# Patient Record
Sex: FEMALE | Race: WHITE | NOT HISPANIC OR LATINO | Employment: FULL TIME | URBAN - METROPOLITAN AREA
[De-identification: names, ages, dates, MRNs, and addresses within clinical notes are randomized per-mention and may not be internally consistent; named-entity substitution may affect disease eponyms.]

---

## 2021-12-28 ENCOUNTER — TELEPHONE (OUTPATIENT)
Dept: SCHEDULING | Facility: IMAGING CENTER | Age: 41
End: 2021-12-28

## 2021-12-29 ENCOUNTER — HOSPITAL ENCOUNTER (OUTPATIENT)
Dept: LAB | Facility: MEDICAL CENTER | Age: 41
End: 2021-12-29
Payer: COMMERCIAL

## 2021-12-29 ENCOUNTER — OFFICE VISIT (OUTPATIENT)
Dept: MEDICAL GROUP | Facility: PHYSICIAN GROUP | Age: 41
End: 2021-12-29
Payer: COMMERCIAL

## 2021-12-29 VITALS
HEART RATE: 68 BPM | OXYGEN SATURATION: 98 % | TEMPERATURE: 98.4 F | DIASTOLIC BLOOD PRESSURE: 70 MMHG | BODY MASS INDEX: 24.59 KG/M2 | SYSTOLIC BLOOD PRESSURE: 108 MMHG | HEIGHT: 64 IN | RESPIRATION RATE: 16 BRPM | WEIGHT: 144 LBS

## 2021-12-29 DIAGNOSIS — R42 DIZZINESS: ICD-10-CM

## 2021-12-29 DIAGNOSIS — Z13.0 SCREENING FOR ENDOCRINE, NUTRITIONAL, METABOLIC AND IMMUNITY DISORDER: ICD-10-CM

## 2021-12-29 DIAGNOSIS — Z13.228 SCREENING FOR ENDOCRINE, NUTRITIONAL, METABOLIC AND IMMUNITY DISORDER: ICD-10-CM

## 2021-12-29 DIAGNOSIS — Z12.31 ENCOUNTER FOR SCREENING MAMMOGRAM FOR BREAST CANCER: ICD-10-CM

## 2021-12-29 DIAGNOSIS — Z13.21 SCREENING FOR ENDOCRINE, NUTRITIONAL, METABOLIC AND IMMUNITY DISORDER: ICD-10-CM

## 2021-12-29 DIAGNOSIS — Z13.29 SCREENING FOR ENDOCRINE, NUTRITIONAL, METABOLIC AND IMMUNITY DISORDER: ICD-10-CM

## 2021-12-29 DIAGNOSIS — H81.13 BPPV (BENIGN PAROXYSMAL POSITIONAL VERTIGO), BILATERAL: ICD-10-CM

## 2021-12-29 PROBLEM — K11.8 SALIVARY GLAND OBSTRUCTION: Status: RESOLVED | Noted: 2021-12-29 | Resolved: 2021-12-29

## 2021-12-29 PROBLEM — K11.8 SALIVARY GLAND OBSTRUCTION: Status: ACTIVE | Noted: 2021-12-29

## 2021-12-29 LAB
25(OH)D3 SERPL-MCNC: 35 NG/ML (ref 30–100)
ALBUMIN SERPL BCP-MCNC: 4.2 G/DL (ref 3.2–4.9)
ALBUMIN/GLOB SERPL: 1.6 G/DL
ALP SERPL-CCNC: 50 U/L (ref 30–99)
ALT SERPL-CCNC: 15 U/L (ref 2–50)
ANION GAP SERPL CALC-SCNC: 10 MMOL/L (ref 7–16)
AST SERPL-CCNC: 18 U/L (ref 12–45)
BILIRUB SERPL-MCNC: 0.5 MG/DL (ref 0.1–1.5)
BUN SERPL-MCNC: 13 MG/DL (ref 8–22)
CALCIUM SERPL-MCNC: 9 MG/DL (ref 8.5–10.5)
CHLORIDE SERPL-SCNC: 108 MMOL/L (ref 96–112)
CHOLEST SERPL-MCNC: 190 MG/DL (ref 100–199)
CO2 SERPL-SCNC: 24 MMOL/L (ref 20–33)
CREAT SERPL-MCNC: 0.71 MG/DL (ref 0.5–1.4)
ERYTHROCYTE [DISTWIDTH] IN BLOOD BY AUTOMATED COUNT: 45.7 FL (ref 35.9–50)
FASTING STATUS PATIENT QL REPORTED: NORMAL
GLOBULIN SER CALC-MCNC: 2.6 G/DL (ref 1.9–3.5)
GLUCOSE SERPL-MCNC: 97 MG/DL (ref 65–99)
HCT VFR BLD AUTO: 44.7 % (ref 37–47)
HDLC SERPL-MCNC: 64 MG/DL
HGB BLD-MCNC: 14.9 G/DL (ref 12–16)
LDLC SERPL CALC-MCNC: 114 MG/DL
MCH RBC QN AUTO: 31.8 PG (ref 27–33)
MCHC RBC AUTO-ENTMCNC: 33.3 G/DL (ref 33.6–35)
MCV RBC AUTO: 95.5 FL (ref 81.4–97.8)
PLATELET # BLD AUTO: 232 K/UL (ref 164–446)
PMV BLD AUTO: 10.9 FL (ref 9–12.9)
POTASSIUM SERPL-SCNC: 4.3 MMOL/L (ref 3.6–5.5)
PROT SERPL-MCNC: 6.8 G/DL (ref 6–8.2)
RBC # BLD AUTO: 4.68 M/UL (ref 4.2–5.4)
SODIUM SERPL-SCNC: 142 MMOL/L (ref 135–145)
TRIGL SERPL-MCNC: 62 MG/DL (ref 0–149)
TSH SERPL DL<=0.005 MIU/L-ACNC: 4.15 UIU/ML (ref 0.38–5.33)
WBC # BLD AUTO: 8.1 K/UL (ref 4.8–10.8)

## 2021-12-29 PROCEDURE — 85027 COMPLETE CBC AUTOMATED: CPT

## 2021-12-29 PROCEDURE — 80053 COMPREHEN METABOLIC PANEL: CPT

## 2021-12-29 PROCEDURE — 80061 LIPID PANEL: CPT

## 2021-12-29 PROCEDURE — 84443 ASSAY THYROID STIM HORMONE: CPT

## 2021-12-29 PROCEDURE — 82306 VITAMIN D 25 HYDROXY: CPT

## 2021-12-29 PROCEDURE — 36415 COLL VENOUS BLD VENIPUNCTURE: CPT

## 2021-12-29 PROCEDURE — 99203 OFFICE O/P NEW LOW 30 MIN: CPT

## 2021-12-29 RX ORDER — MECLIZINE HCL 12.5 MG/1
12.5 TABLET ORAL 3 TIMES DAILY PRN
Qty: 60 TABLET | Refills: 0 | Status: SHIPPED | OUTPATIENT
Start: 2021-12-29 | End: 2022-03-31

## 2021-12-29 ASSESSMENT — PATIENT HEALTH QUESTIONNAIRE - PHQ9: CLINICAL INTERPRETATION OF PHQ2 SCORE: 0

## 2021-12-29 NOTE — ASSESSMENT & PLAN NOTE
Patient reports that she woke up with an episode of dizziness in mid October that lasted for couple of hours and improved with Dramamine.  This started again on [place] Day but was a little different.  She noted that if she moves the wrong way or in the wrong direction, the room would start spinning.  This caused some nausea.  It was intermittent over several days.  She noticed that it was worse when transitioning from laying to sitting.  She has no ringing in the ears.  She did start training to do an 8 mile swim race back in September with her sister and has been swimming 3 times per week for an hour to an hour and a half.

## 2021-12-29 NOTE — PROGRESS NOTES
CC:    Chief Complaint   Patient presents with   • Establish Care   • Dizziness        HISTORY OF THE PRESENT ILLNESS: This is a pleasant 41 y.o. female presenting today to [facility], who wishes to discuss the following problems listed below:     Dizziness  Patient reports that she woke up with an episode of dizziness in mid October that lasted for couple of hours and improved with Dramamine.  This started again on [place] Day but was a little different.  She noted that if she moves the wrong way or in the wrong direction, the room would start spinning.  This caused some nausea.  It was intermittent over several days.  She noticed that it was worse when transitioning from laying to sitting.  She has no ringing in the ears.  She did start training to do an 8 mile swim race back in September with her sister and has been swimming 3 times per week for an hour to an hour and a half.     Patient is otherwise healthy with no chronic medical conditions.  She and her  just moved here from [place] due to his job.  She has 3 children, ages 11, 22 and 29.  All are healthy.  No history of cancers in her family.  No chronic medical conditions to report.     History reviewed. No pertinent past medical history.    Allergies: Penicillins    Current Outpatient Medications   Medication Sig Dispense Refill   • meclizine (ANTIVERT) 12.5 MG Tab Take 1 Tablet by mouth 3 times a day as needed for Dizziness. 60 Tablet 0     No current facility-administered medications for this visit.       ROS:     Constitutional: Patient denies any fever, chills, night sweats, weight loss/gain, fatigue, or malaise.   Eyes: Patient denies any photophobia, eye pain, diplopia, discharge, dryness, excessive tearing, redness, or VA changes.   ENT: Patient denies any runny nose, hoarseness, sore throat, or dysphagia.   Resp: Patient denies cough, wheezing, or shortness of breath.   CV: Patient denies any chest pain, claudication, cyanosis,  "palpitations, or edema.   GI: Patient denies any constipation, nausea, vomiting, diarrhea, bloating, abdominal pain, or dyspepsia.   MSK: Patient denies any joint pain, cramps, swelling, weakness, stiffness, or tremor  Skin: Patient denies any color changes, skin changes, lesions, ulcerations, wounds, and pruritus, hair or nail changes.   Neuro: Patient denies any headache, numbness or tingling  Psych: Patient denies any suicidal or homicidal ideation, depression or anxiety.     Exam: /70 (BP Location: Right arm, Patient Position: Sitting, BP Cuff Size: Adult)   Pulse 68   Temp 36.9 °C (98.4 °F) (Temporal)   Resp 16   Ht 1.626 m (5' 4\")   Wt 65.3 kg (144 lb)   SpO2 98%  Body mass index is 24.72 kg/m².    Gen: Alert and oriented x4. Well developed, well-nourished female in no apparent distress.  Skin: Warm, dry, good turgor, no rashes in visible areas or lacerations appreciated.   Eye: EOM intact, pupils equal, round and reactive, conjunctiva clear, lids normal.  Neck: Trachea midline, no masses, no thyromegaly  GI:  Soft, non-tender abdomen with no distention.   MSK: Normal gait, moves all extremities.  Neuro: Alert and oriented x 4, non-focal exam with motor and sensory grossly intact.  Ext: No clubbing, cyanosis, edema.  Psych: Normal behavior, affect and mood.      Assessment/Plan:    41 y.o. female with the followin. BPPV (benign paroxysmal positional vertigo), bilateral  Discussed with patient that I am suspicious that she has BPPV.  No red flag symptoms today.  The episode that started over  is since resolved.  Meclizine provided for as needed relief of dizziness should another episode return.  Discussed proper precautions of protecting both ear canals during swimming sessions.  Patient has purchased a swimming And earplugs to use.  Refer to physical therapy for vestibular therapy.  - meclizine (ANTIVERT) 12.5 MG Tab; Take 1 Tablet by mouth 3 times a day as needed for Dizziness.  " Dispense: 60 Tablet; Refill: 0  - Referral to Physical Therapy    2. Dizziness  Rule out underlying anemia or electrolyte abnormalities.  - Comp Metabolic Panel; Future  - CBC WITHOUT DIFFERENTIAL; Future    3. Screening for endocrine, nutritional, metabolic and immunity disorder  - TSH WITH REFLEX TO FT4; Future  - VITAMIN D,25 HYDROXY; Future  - Lipid Profile; Future    4. Encounter for screening mammogram for breast cancer  - MA-SCREENING MAMMO BILAT W/TOMOSYNTHESIS W/CAD; Future    Follow-up: Return in about 3 months (around 3/29/2022) for pap, review dizziness .    Health Maintenance: Completed    I have placed the below orders and discussed them with an approved delegating provider.  The MA is performing the below orders under the direction of Mariely Cuevas MD.    Please note that this dictation was created using voice recognition software. I have made every reasonable attempt to correct obvious errors, but I expect that there are errors of grammar and possibly content that I did not discover before finalizing the note.    Electronically signed by BRAYDEN Clemons on December 29, 2021

## 2022-01-27 ENCOUNTER — HOSPITAL ENCOUNTER (OUTPATIENT)
Dept: RADIOLOGY | Facility: MEDICAL CENTER | Age: 42
End: 2022-01-27
Payer: COMMERCIAL

## 2022-01-27 DIAGNOSIS — Z12.31 ENCOUNTER FOR SCREENING MAMMOGRAM FOR BREAST CANCER: ICD-10-CM

## 2022-01-27 PROCEDURE — 77063 BREAST TOMOSYNTHESIS BI: CPT

## 2022-02-14 ENCOUNTER — PHYSICAL THERAPY (OUTPATIENT)
Dept: PHYSICAL THERAPY | Facility: REHABILITATION | Age: 42
End: 2022-02-14
Payer: COMMERCIAL

## 2022-02-14 DIAGNOSIS — H81.13 BPPV (BENIGN PAROXYSMAL POSITIONAL VERTIGO), BILATERAL: ICD-10-CM

## 2022-02-14 PROCEDURE — 97162 PT EVAL MOD COMPLEX 30 MIN: CPT

## 2022-02-14 PROCEDURE — 95992 CANALITH REPOSITIONING PROC: CPT

## 2022-02-14 NOTE — OP THERAPY EVALUATION
Outpatient Physical Therapy  VESTIBULAR EVALUATION    [facility] Physical [address]  [address]  [place] [address]  Phone:  [phone]  Fax:  [phone]    Date of Evaluation: 02/14/2022    Patient: Yareli Isaacs  YOB: 1980  MRN: [id number]     Referring Provider: YOL[place] [place] [address]   Referring Diagnosis: BPPV (benign paroxysmal positional vertigo), bilateral [H81.13]     Time Calculation    Start time: 1435  Stop time: 1515 Time Calculation (min): 40 minutes           Chief Complaint: Loss Of Balance    Visit Diagnoses     ICD-10-CM   1. BPPV (benign paroxysmal positional vertigo), bilateral  H81.13         History of Present Illness:     Date of onset:  10/10/2021    Mechanism of injury:  Pt reports dizziness occurred after increasing her swims to longer distance.  Swam three miles in October.  Next day, woke dizzy (spinning) and couldn't move.  Lasted an hour to hour and a half. After that, started wearing ear plugs in water in hopes of avoiding dizziness.  Dec 25, dizziness returned.  Certain movements make her dizzy and nauseous.  Went to PCP, rx'd Meclizine.  Only took it once- made her tired and did not resolve dizziness.  Dizziness occurs when bending forward, lying down. move head to right when lying down, and sitting up from lying down.  Also when rolling left sidelying to right sidelying.  It sometimes wakes her from sleep when she rolls or turns her head.  Gets in/out of bed on right side.     Symptoms are worse after swimming, even with ear plugs.  Driving, visual scanning- reports no symptoms, but being careful.  Video game bothers her.  Pt is a - a lot of moving around. Sometimes symptomatic at work when bending to pick things up.  Not getting nauseous or headaches assoc with dizziness anymore.    Ear problems:  None    Sleep disturbance:  Interrupted sleep  Patient  goals:     Patient goals for therapy:  Improved balance      Past Medical History:   Diagnosis Date   • Salivary gland obstruction 12/29/2021     No past surgical history on file.  Social History     Tobacco Use   • Smoking status: Never Smoker   • Smokeless tobacco: Never Used   Substance Use Topics   • Alcohol use: Not Currently     Family and Occupational History     Socioeconomic History   • Marital status:      Spouse name: Not on file   • Number of children: Not on file   • Years of education: Not on file   • Highest education level: Not on file   Occupational History   • Not on file       Objective:    Gait:     Comments: WNL  Vestibulospinal Exam:     Comments: Narrow stance- EO and EC= WNL.  Mild increase sway with EC.  Narrow stance on foam- EO and EC, increased sway with EC, required SBA/CGA.  No symptoms reported.  Oculomotor Exam:         Details: No spontaneous nystagmus eccentric gaze    No saccadic eye movements    Smooth pursuit present    Convergence:        Normal convergence  Vertebrobasilar Exam:    Normal De Kleyn-Nieuwenhuyse test    Normal sustained cervical rotation test  Vestibulo-Ocular Exam:     Normal head-shaking nystagmus test  Normal head thrust test  BPPV Exam:     Abnormal Susan-Hallpike        Latency (sec): 7        Duration (sec): 5        Findings: positive right    Comments: Right Susan-Hallpike positive for spinning.        Therapeutic Treatments and Modalities:     1. Canalith Repositioning (CPT 50560), Right Epley's performed., Delayed onset of spinning in L sidelying position (30 sec latency, 8 sec duration)., Pt performed sit to supine and supine to sit at right side of therapy mat following Epley's, with minimal to no symptoms.    Therapeutic Treatment and Modalities Summary: Instructed in self-Epley.  Recommend perform 1x nightly, after swimming.    Time-based treatments/modalities:             Assessment:     Functional impairments:  Positive BPPV (right)     Assessment details:  41 y.o. female presents with signs/symptoms consistent with right BPPV.  Pt demonstrates improvement in symptoms following right Epley's maneuver.  Pt will benefit from skilled PT services to assess for progression or resolution of symptoms, adjust HEP as needed, and provide education and prognosis information.    Prognosis: good    Goals:     Short term goals:  1. Pt reports minimal to no symptoms when lying down at home.  2. Pt able to bend forward to floor without symptoms.      Short term goal time span:  2-4 weeks    Long term goals:  1. Pt able to self-manage vertigo symptoms independently.  2. Pt has less than three episodes of dizziness a week.    Long term goal time span:  6-8 weeks  Plan:     Therapy options:  Physical therapy treatment to continue    Planned therapy interventions:  Canalith Repositioning (CPT 17569), Therapeutic Exercise (CPT 67815) and Neuromuscular Re-education (CPT 33981)    Frequency:  1x week    Duration in visits:  6    Discussed with:  Patient      Functional Assessment Used          Referring provider co-signature:  I have reviewed this plan of care and my co-signature certifies the need for services.    Certification Period: 02/14/2022 to  04/11/22    Physician Signature: ________________________________ Date: ______________

## 2022-02-21 ENCOUNTER — PHYSICAL THERAPY (OUTPATIENT)
Dept: PHYSICAL THERAPY | Facility: REHABILITATION | Age: 42
End: 2022-02-21
Payer: COMMERCIAL

## 2022-02-21 DIAGNOSIS — H81.13 BPPV (BENIGN PAROXYSMAL POSITIONAL VERTIGO), BILATERAL: ICD-10-CM

## 2022-02-21 PROCEDURE — 97112 NEUROMUSCULAR REEDUCATION: CPT

## 2022-02-21 NOTE — OP THERAPY DAILY TREATMENT
Outpatient Physical Therapy  DAILY TREATMENT     [facility] Physical [address].  [address]  [place] [address]  Phone:  [phone]  Fax:  [phone]    Date: 02/21/2022    Patient: Yareli Isaacs  YOB: 1980  MRN: [id number]     Time Calculation    Start time: 1430  Stop time: 1454 Time Calculation (min): 24 minutes         Chief Complaint: Vertigo    Visit #: 2    SUBJECTIVE:  Pt reports overall no symptoms since original appointments.       OBJECTIVE:  Eval:Abnormal Grantham-Hallpike        Latency (sec): 7        Duration (sec): 5        Findings: positive right    Comments: Right Susan-Hallpike positive for spinning.          Therapeutic Exercises (CPT 85630):     20. 2/14-4/11    Therapeutic Treatments and Modalities:     1. Neuromuscular Re-education (CPT 57474)    Therapeutic Treatment and Modalities Summary: Reassessment of Grantham hallpike, log roll, head hang all negative bilaterally.  Pt educated on anatomy related to vestibular system and BPV with use of model. Handouts provided and performed self epley maneuver with use f pillow. Verbalized all instructions. Pt verbalized understanding.     Time-based treatments/modalities:    Physical Therapy Timed Treatment Charges  Neuromusc re-ed, balance, coor, post minutes (CPT 53951): 24 minutes    ASSESSMENT:   Today, pt presents without abnormal positional vertigo testing and subjective reports of resolution of symptoms. Time spent educated on self maneuver and reoccur ance rates. Will leave pt chart open x 30 days in case symptoms return with likely plan to DC for resolution of symptoms. Pt verbalized agreement.     PLAN/RECOMMENDATIONS:   Plan for treatment: DC likely, reassess as needed x 30 days

## 2022-03-10 ENCOUNTER — APPOINTMENT (OUTPATIENT)
Dept: PHYSICAL THERAPY | Facility: REHABILITATION | Age: 42
End: 2022-03-10
Payer: COMMERCIAL

## 2022-03-15 ENCOUNTER — APPOINTMENT (OUTPATIENT)
Dept: PHYSICAL THERAPY | Facility: REHABILITATION | Age: 42
End: 2022-03-15
Payer: COMMERCIAL

## 2022-03-17 ENCOUNTER — APPOINTMENT (OUTPATIENT)
Dept: PHYSICAL THERAPY | Facility: REHABILITATION | Age: 42
End: 2022-03-17
Payer: COMMERCIAL

## 2022-03-21 ENCOUNTER — APPOINTMENT (OUTPATIENT)
Dept: PHYSICAL THERAPY | Facility: REHABILITATION | Age: 42
End: 2022-03-21
Payer: COMMERCIAL

## 2022-03-23 ENCOUNTER — APPOINTMENT (OUTPATIENT)
Dept: PHYSICAL THERAPY | Facility: REHABILITATION | Age: 42
End: 2022-03-23
Payer: COMMERCIAL

## 2022-03-28 ENCOUNTER — APPOINTMENT (OUTPATIENT)
Dept: PHYSICAL THERAPY | Facility: REHABILITATION | Age: 42
End: 2022-03-28
Payer: COMMERCIAL

## 2022-03-30 ENCOUNTER — APPOINTMENT (OUTPATIENT)
Dept: PHYSICAL THERAPY | Facility: REHABILITATION | Age: 42
End: 2022-03-30
Payer: COMMERCIAL

## 2022-03-31 ENCOUNTER — OFFICE VISIT (OUTPATIENT)
Dept: MEDICAL GROUP | Facility: PHYSICIAN GROUP | Age: 42
End: 2022-03-31
Payer: COMMERCIAL

## 2022-03-31 ENCOUNTER — HOSPITAL ENCOUNTER (OUTPATIENT)
Facility: MEDICAL CENTER | Age: 42
End: 2022-03-31
Payer: COMMERCIAL

## 2022-03-31 VITALS
OXYGEN SATURATION: 96 % | SYSTOLIC BLOOD PRESSURE: 102 MMHG | TEMPERATURE: 98.8 F | RESPIRATION RATE: 16 BRPM | WEIGHT: 133 LBS | HEART RATE: 76 BPM | HEIGHT: 64 IN | DIASTOLIC BLOOD PRESSURE: 62 MMHG | BODY MASS INDEX: 22.71 KG/M2

## 2022-03-31 DIAGNOSIS — Z11.51 SCREENING FOR HPV (HUMAN PAPILLOMAVIRUS): ICD-10-CM

## 2022-03-31 DIAGNOSIS — Z01.419 ENCOUNTER FOR GYNECOLOGICAL EXAMINATION: ICD-10-CM

## 2022-03-31 DIAGNOSIS — Z12.4 SCREENING FOR CERVICAL CANCER: ICD-10-CM

## 2022-03-31 PROCEDURE — 87624 HPV HI-RISK TYP POOLED RSLT: CPT

## 2022-03-31 PROCEDURE — 87591 N.GONORRHOEAE DNA AMP PROB: CPT

## 2022-03-31 PROCEDURE — 99396 PREV VISIT EST AGE 40-64: CPT

## 2022-03-31 PROCEDURE — 87491 CHLMYD TRACH DNA AMP PROBE: CPT

## 2022-03-31 PROCEDURE — 88175 CYTOPATH C/V AUTO FLUID REDO: CPT

## 2022-03-31 ASSESSMENT — FIBROSIS 4 INDEX: FIB4 SCORE: 0.86

## 2022-03-31 ASSESSMENT — PATIENT HEALTH QUESTIONNAIRE - PHQ9: CLINICAL INTERPRETATION OF PHQ2 SCORE: 0

## 2022-03-31 NOTE — PROGRESS NOTES
Subjective:     CC:   Chief Complaint   Patient presents with   • Follow-Up     Dizzy and blood work       HPI:   Yareli Isaacs is a 41 y.o. female who presents for annual exam. She is feeling well and denies any complaints.    Ob-Gyn/ History:    Patient has GYN provider: no  /Para:    Last Pap Smear:  6 years ago. No history of abnormal pap smears.  Gyn Surgery:  Tubes tied.  Current Contraceptive Method: Tubal ligation   Is currently sexually active.  Last menstrual period:  3/15/2022.  Periods regular. moderate bleeding. Cramping is moderate.   She does take OTC analgesics for cramps.  No significant bloating/fluid retention, pelvic pain, or dyspareunia. No vaginal discharge  Urinary incontinence: None, stress incontinence.       Health Maintenance  Cholesterol Screening: UTD   Diabetes Screening: UTD   Substance Abuse: None   Safe in relationship.   Seat belts, bike helmet, gun safety discussed.  Sun protection used.    Cancer screening  Cervical Cancer Screening: Done today   Breast Cancer Screening: Mammogram up to date     Infectious disease screening/Immunizations  --Practices safe sex.  --Immunizations:    Influenza: Up to date        She  has a past medical history of Salivary gland obstruction (2021).  She  has no past surgical history on file.    No family history on file.    Social History     Socioeconomic History   • Marital status:      Spouse name: Not on file   • Number of children: Not on file   • Years of education: Not on file   • Highest education level: Not on file   Occupational History   • Not on file   Tobacco Use   • Smoking status: Never Smoker   • Smokeless tobacco: Never Used   Vaping Use   • Vaping Use: Never used   Substance and Sexual Activity   • Alcohol use: Not Currently   • Drug use: Never   • Sexual activity: Yes     Partners: Male   Other Topics Concern   • Not on file   Social History Narrative   • Not on file     Social Determinants of Health  "    Financial Resource Strain: Not on file   Food Insecurity: Not on file   Transportation Needs: Not on file   Physical Activity: Not on file   Stress: Not on file   Social Connections: Not on file   Intimate Partner Violence: Not on file   Housing Stability: Not on file       Patient Active Problem List    Diagnosis Date Noted   • Dizziness 12/29/2021         No current outpatient medications on file.     No current facility-administered medications for this visit.     Allergies   Allergen Reactions   • Penicillins Rash     Rash        Review of Systems   Constitutional: Negative for fever, chills and malaise/fatigue.   HENT: Negative for congestion.    Eyes: Negative for pain.   Respiratory: Negative for cough and shortness of breath.    Cardiovascular: Negative for leg swelling.   Gastrointestinal: Negative for nausea, vomiting, abdominal pain and diarrhea.   Genitourinary: Negative for dysuria and hematuria.   Skin: Negative for rash.   Neurological: Negative for dizziness, focal weakness and headaches.   Endo/Heme/Allergies: Does not bruise/bleed easily.   Psychiatric/Behavioral: Negative for depression.  The patient is not nervous/anxious.      Objective:     /62   Pulse 76   Temp 37.1 °C (98.8 °F) (Temporal)   Resp 16   Ht 1.626 m (5' 4\")   Wt 60.3 kg (133 lb)   LMP 03/15/2022   SpO2 96%   Breastfeeding No   BMI 22.83 kg/m²   Body mass index is 22.83 kg/m².  Wt Readings from Last 4 Encounters:   03/31/22 60.3 kg (133 lb)   12/29/21 65.3 kg (144 lb)       Physical Exam:  Constitutional: Well-developed and well-nourished. Not diaphoretic. No distress.   Skin: Skin is warm and dry. No rash noted.  Head: Atraumatic without lesions.  Eyes: Conjunctivae and extraocular motions are normal. Pupils are equal, round, and reactive to light. No scleral icterus.   Ears:  External ears unremarkable. Tympanic membranes clear and intact.  Nose: Nares patent. Septum midline. Turbinates without erythema nor " edema. No discharge.   Mouth/Throat: Dentition is intact. Tongue normal. Oropharynx is clear and moist. Posterior pharynx without erythema or exudates.  Neck: Supple, trachea midline. Normal range of motion. No thyromegaly present. No lymphadenopathy--cervical or supraclavicular.  Abdomen: Soft, non tender, and without distention. Active bowel sounds in all four quadrants. No rebound, guarding, masses or HSM.  :Perineum and external genitalia normal without rash. Vagina with normal and physiologic discharge. Cervix without visible lesions or discharge. Bimanual exam without adnexal masses or cervical motion tenderness.  Extremities: No cyanosis, clubbing, erythema, nor edema. Distal pulses intact and symmetric.   Musculoskeletal: All major joints AROM full in all directions without pain.  Neurological: Alert and oriented x 3. DTRs 2+/3 and symmetric. No cranial nerve deficit. 5/5 myotomes. Sensation intact.   Psychiatric:  Behavior, mood, and affect are appropriate.    A chaperone was offered to the patient during today's exam. Chaperone name: Anahi was present.    Assessment and Plan:     1. Screening for cervical cancer  Thinprep Pap W/HPV and CTNG   2. Encounter for gynecological examination  Thinprep Pap W/HPV and CTNG   3. Screening for HPV (human papillomavirus)  Thinprep Pap W/HPV and CTNG       HCM:  UTD   Labs per orders  Immunizations per orders  Patient counseled about skin care, diet, supplements, prenatal vitamins, safe sex and exercise.      Follow-up: Return in about 1 year (around 3/31/2023) for annual wellness .

## 2022-04-01 DIAGNOSIS — Z12.4 SCREENING FOR CERVICAL CANCER: ICD-10-CM

## 2022-04-01 DIAGNOSIS — Z11.51 SCREENING FOR HPV (HUMAN PAPILLOMAVIRUS): ICD-10-CM

## 2022-04-01 DIAGNOSIS — Z01.419 ENCOUNTER FOR GYNECOLOGICAL EXAMINATION: ICD-10-CM

## 2022-04-03 LAB
C TRACH DNA GENITAL QL NAA+PROBE: NEGATIVE
CYTOLOGY REG CYTOL: NORMAL
HPV HR 12 DNA CVX QL NAA+PROBE: NEGATIVE
HPV16 DNA SPEC QL NAA+PROBE: NEGATIVE
HPV18 DNA SPEC QL NAA+PROBE: NEGATIVE
N GONORRHOEA DNA GENITAL QL NAA+PROBE: NEGATIVE
SPECIMEN SOURCE: NORMAL
SPECIMEN SOURCE: NORMAL

## 2022-04-04 ENCOUNTER — APPOINTMENT (OUTPATIENT)
Dept: PHYSICAL THERAPY | Facility: REHABILITATION | Age: 42
End: 2022-04-04
Payer: COMMERCIAL

## 2022-04-05 ENCOUNTER — TELEPHONE (OUTPATIENT)
Dept: PHYSICAL THERAPY | Facility: REHABILITATION | Age: 42
End: 2022-04-05
Payer: COMMERCIAL

## 2022-04-05 NOTE — OP THERAPY DISCHARGE SUMMARY
Outpatient Physical Therapy  DISCHARGE SUMMARY NOTE      [facility] Physical Therapy [address]  [address]  [place] [address]  Phone:  [phone]  Fax:  [phone]    Date of Visit: 04/05/2022    Patient: Yareli Isaacs  YOB: 1980  MRN: [id number]     Referring Provider: YOLA Canales  [address] [place] [address]   Referring Diagnosis: BPPV (benign paroxysmal positional vertigo), bilateral [H81.13]         Functional Assessment Used        Your patient is being discharged from Physical Therapy with the following comments:   · Patient has failed to schedule or reschedule follow-up visits    Comments:  Pt last seen 2/21 and reported improved symptoms. As patient has not returned and has been longer than 30 days she is appropriate to DC. Unable to assess goals.   Shey Wong, PT, DPT    Date: 4/5/2022